# Patient Record
Sex: FEMALE | URBAN - METROPOLITAN AREA
[De-identification: names, ages, dates, MRNs, and addresses within clinical notes are randomized per-mention and may not be internally consistent; named-entity substitution may affect disease eponyms.]

---

## 2020-09-28 ENCOUNTER — APPOINTMENT (RX ONLY)
Dept: URBAN - METROPOLITAN AREA CLINIC 304 | Facility: CLINIC | Age: 40
Setting detail: DERMATOLOGY
End: 2020-09-28

## 2020-09-28 DIAGNOSIS — R21 RASH AND OTHER NONSPECIFIC SKIN ERUPTION: ICD-10-CM

## 2020-09-28 PROCEDURE — ? BIOPSY BY PUNCH METHOD

## 2020-09-28 PROCEDURE — ? ADDITIONAL NOTES

## 2020-09-28 PROCEDURE — ? TREATMENT REGIMEN

## 2020-09-28 PROCEDURE — 11104 PUNCH BX SKIN SINGLE LESION: CPT

## 2020-09-28 ASSESSMENT — LOCATION ZONE DERM: LOCATION ZONE: TRUNK

## 2020-09-28 ASSESSMENT — LOCATION SIMPLE DESCRIPTION DERM: LOCATION SIMPLE: RIGHT UPPER BACK

## 2020-09-28 ASSESSMENT — LOCATION DETAILED DESCRIPTION DERM: LOCATION DETAILED: RIGHT SUPERIOR UPPER BACK

## 2020-09-28 NOTE — PROCEDURE: BIOPSY BY PUNCH METHOD
Detail Level: Detailed
Was A Bandage Applied: Yes
Punch Size In Mm: 4
Biopsy Type: H and E
Anesthesia Type: 1% lidocaine with epinephrine
Anesthesia Volume In Cc (Will Not Render If 0): 0.5
Additional Anesthesia Volume In Cc (Will Not Render If 0): 0
Hemostasis: None
Epidermal Sutures: 4-0 Ethilon
Wound Care: Petrolatum
Dressing: bandage
Suture Removal: 14 days
Patient Will Remove Sutures At Home?: No
Lab: 6
Lab Facility: 3
Consent: Written consent was obtained and risks were reviewed including but not limited to scarring, infection, bleeding, scabbing, incomplete removal, nerve damage and allergy to anesthesia.
Post-Care Instructions: I reviewed with the patient in detail post-care instructions. Patient is to keep the biopsy site dry overnight, and then apply vaseline or aquaphor twice daily until healed.
Home Suture Removal Text: Patient was provided a home suture removal kit and will remove their sutures at home.  If they have any questions or difficulties they will call the office.
Notification Instructions: Patient will be notified of biopsy results. However, patient instructed to call the office if not contacted within 2 weeks.
Billing Type: Third-Party Bill
Information: Selecting Yes will display possible errors in your note based on the variables you have selected. This validation is only offered as a suggestion for you. PLEASE NOTE THAT THE VALIDATION TEXT WILL BE REMOVED WHEN YOU FINALIZE YOUR NOTE. IF YOU WANT TO FAX A PRELIMINARY NOTE YOU WILL NEED TO TOGGLE THIS TO 'NO' IF YOU DO NOT WANT IT IN YOUR FAXED NOTE.

## 2020-09-28 NOTE — PROCEDURE: ADDITIONAL NOTES
Additional Notes: Intramuscular injection 1.5 cc of kenalog 40cc/mg given to the right glute. Risks and side effects were discussed with patient.
Detail Level: Simple

## 2020-09-28 NOTE — PROCEDURE: TREATMENT REGIMEN
Detail Level: Zone
Plan: Patient is to call within couple days of today’s OV if symptoms are not getting better. Information was given. RTC 14 days to discuss results.
Initiate Treatment: Zyrtec 3x daily
Discontinue Regimen: Supplements

## 2020-10-12 ENCOUNTER — APPOINTMENT (RX ONLY)
Dept: URBAN - METROPOLITAN AREA CLINIC 304 | Facility: CLINIC | Age: 40
Setting detail: DERMATOLOGY
End: 2020-10-12

## 2020-10-12 VITALS — TEMPERATURE: 97.1 F

## 2020-10-12 DIAGNOSIS — Z48.02 ENCOUNTER FOR REMOVAL OF SUTURES: ICD-10-CM

## 2020-10-12 PROCEDURE — 99024 POSTOP FOLLOW-UP VISIT: CPT

## 2020-10-12 PROCEDURE — ? ADDITIONAL NOTES

## 2020-10-12 PROCEDURE — ? SUTURE REMOVAL (GLOBAL PERIOD)

## 2020-10-12 ASSESSMENT — LOCATION SIMPLE DESCRIPTION DERM: LOCATION SIMPLE: RIGHT UPPER BACK

## 2020-10-12 ASSESSMENT — LOCATION ZONE DERM: LOCATION ZONE: TRUNK

## 2020-10-12 ASSESSMENT — LOCATION DETAILED DESCRIPTION DERM: LOCATION DETAILED: RIGHT SUPERIOR UPPER BACK

## 2020-10-12 NOTE — PROCEDURE: SUTURE REMOVAL (GLOBAL PERIOD)
Detail Level: Detailed
Add 71251 Cpt? (Important Note: In 2017 The Use Of 73454 Is Being Tracked By Cms To Determine Future Global Period Reimbursement For Global Periods): yes

## 2020-10-12 NOTE — PROCEDURE: ADDITIONAL NOTES
Additional Notes: Biopsy results discussed - consistent with urticaria - previous lab work up/thyroid labs wnl - likely idiopathic. Discussed treatment options, antihistamines, dapsone, xolair. Patient has tried antihistamines with no relief - will begin paperwork for xolair.
Detail Level: Simple

## 2020-10-14 ENCOUNTER — APPOINTMENT (RX ONLY)
Dept: URBAN - METROPOLITAN AREA CLINIC 307 | Facility: CLINIC | Age: 40
Setting detail: DERMATOLOGY
End: 2020-10-14

## 2020-10-14 DIAGNOSIS — L50.1 IDIOPATHIC URTICARIA: ICD-10-CM

## 2020-10-14 PROCEDURE — ? XOLAIR INITIATION

## 2020-10-14 PROCEDURE — ? XOLAIR INJECTION

## 2020-10-14 PROCEDURE — ? COUNSELING

## 2020-10-14 ASSESSMENT — LOCATION SIMPLE DESCRIPTION DERM: LOCATION SIMPLE: LEFT POSTERIOR UPPER ARM

## 2020-10-14 ASSESSMENT — LOCATION DETAILED DESCRIPTION DERM: LOCATION DETAILED: LEFT PROXIMAL POSTERIOR UPPER ARM

## 2020-10-14 ASSESSMENT — LOCATION ZONE DERM: LOCATION ZONE: ARM

## 2020-10-14 NOTE — PROCEDURE: XOLAIR INITIATION
Is Cellcept Contraindicated?: No
Pregnancy And Lactation Warning Text: This medication is Pregnancy Category B and is considered safe during pregnancy. This medication is excreted in breast milk.
Xolair Dosinmg SC every 4 weeks
Diagnosis (Required): Chronic Urticaria
Xolair Monitoring Guidelines: The patient should be monitored during dosing for anaphylaxis.
Comments: Injection given to patient today - monitored x 30 minutes with no reaction - will rtc in one month for repeat injection - pa filled out today
Detail Level: Zone

## 2020-11-12 ENCOUNTER — APPOINTMENT (RX ONLY)
Dept: URBAN - METROPOLITAN AREA CLINIC 307 | Facility: CLINIC | Age: 40
Setting detail: DERMATOLOGY
End: 2020-11-12

## 2020-11-12 DIAGNOSIS — L50.1 IDIOPATHIC URTICARIA: ICD-10-CM

## 2020-11-12 PROCEDURE — ? ADDITIONAL NOTES

## 2020-11-12 PROCEDURE — ? PRESCRIPTION SAMPLES PROVIDED

## 2020-11-12 ASSESSMENT — LOCATION ZONE DERM: LOCATION ZONE: ARM

## 2020-11-12 ASSESSMENT — LOCATION SIMPLE DESCRIPTION DERM: LOCATION SIMPLE: RIGHT POSTERIOR UPPER ARM

## 2020-11-12 ASSESSMENT — LOCATION DETAILED DESCRIPTION DERM: LOCATION DETAILED: RIGHT PROXIMAL POSTERIOR UPPER ARM

## 2021-01-07 ENCOUNTER — APPOINTMENT (RX ONLY)
Dept: URBAN - METROPOLITAN AREA CLINIC 307 | Facility: CLINIC | Age: 41
Setting detail: DERMATOLOGY
End: 2021-01-07

## 2021-01-07 DIAGNOSIS — L50.1 IDIOPATHIC URTICARIA: ICD-10-CM

## 2021-01-07 PROCEDURE — 96372 THER/PROPH/DIAG INJ SC/IM: CPT

## 2021-01-07 PROCEDURE — ? COUNSELING

## 2021-01-07 PROCEDURE — ? XOLAIR INJECTION

## 2021-01-07 PROCEDURE — ? RECOMMENDATIONS

## 2021-01-07 ASSESSMENT — LOCATION DETAILED DESCRIPTION DERM
LOCATION DETAILED: RIGHT MEDIAL UPPER BACK
LOCATION DETAILED: RIGHT ANTERIOR PROXIMAL THIGH

## 2021-01-07 ASSESSMENT — LOCATION SIMPLE DESCRIPTION DERM
LOCATION SIMPLE: RIGHT UPPER BACK
LOCATION SIMPLE: RIGHT THIGH

## 2021-01-07 ASSESSMENT — LOCATION ZONE DERM
LOCATION ZONE: TRUNK
LOCATION ZONE: LEG

## 2021-01-07 NOTE — PROCEDURE: XOLAIR INJECTION
Procedure Type: Therapeutic, prophylactic, or diagnostic injection; subcutaneous or intramuscular; CPT: 01500 Procedure Type: Therapeutic, prophylactic, or diagnostic injection; subcutaneous or intramuscular; CPT: 04984

## 2021-01-07 NOTE — PROCEDURE: RECOMMENDATIONS
Recommendations (Free Text): Follow up with Dr. Thornton
Detail Level: Zone
Render Risk Assessment In Note?: no
Recommendation Preamble: The following recommendations were made during the visit:
no